# Patient Record
Sex: MALE | Race: WHITE | NOT HISPANIC OR LATINO | Employment: FULL TIME | URBAN - METROPOLITAN AREA
[De-identification: names, ages, dates, MRNs, and addresses within clinical notes are randomized per-mention and may not be internally consistent; named-entity substitution may affect disease eponyms.]

---

## 2018-08-20 NOTE — PROGRESS NOTES
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  He had his wife  Jesika both suspected that he had significant obstructive sleep apnea syndrome.  He finally had a sleep study performed on 1/8/2016 which showed an apnea hypopnea index of 48.7, a REM index is 32.7, a supine index of 68.6, a maria luisa saturation of 75%, and saturations less than 89% for 468 minutes.  He then was provided a positive airway pressure titration but developed treatment emergent central apneas and was placed on ASV.  His final ASV settings were EPAP 6/min pressure support 3/maximum pressure support 15.  His AHI on these final settings was approximate 5.6.      Is very compliant with use. 30 day compliance showed use for 100% of the time for 6:42. Residual ahi is 0.2. Zero leak      There are no active problems to display for this patient.      Past Medical History:   Diagnosis Date   • Sleep apnea    • Snoring     sleep apnea questionairre completed and on chart        Past Surgical History:   Procedure Laterality Date   • INGUINAL HERNIA REPAIR  2/25/2011    Performed by NICKY WANG at SURGERY [facility] ORS   • INGUINAL HERNIA LAPAROSCOPIC  10/10/08    left; Performed by NICKY WANG at SURGERY SAME DAY [facility] ORS   • KNEE ARTHROSCOPY  2007    right       Family History   Problem Relation Age of Onset   • Diabetes Unknown    • Heart Disease Unknown    • Hypertension Unknown    • Cancer Unknown    • Sleep Apnea Brother    • Sleep Apnea Maternal Uncle        Social History     Social History   • Marital status:      Spouse name: N/A   • Number of children: N/A   • Years of education: N/A     Occupational History   • Not on file.     Social History Main Topics   • Smoking status: Never Smoker   • Smokeless tobacco: Never Used   • Alcohol use Yes      Comment: weekends   • Drug use: No   • Sexual activity: Not on file     Other Topics Concern  "  • Not on file     Social History Narrative   • No narrative on file       Current Outpatient Prescriptions   Medication Sig Dispense Refill   • fluticasone (FLONASE) 50 MCG/ACT nasal spray Spray 1 Spray in nose every day.     • aspirin 81 MG tablet Take 81 mg by mouth every day.       No current facility-administered medications for this visit.     \"CURRENT RX\"    ALLERGIES: Sulfa drugs    ROS    Constitutional: Denies fever, chills, sweats,  weight loss, fatigue  Cardiovascular: Denies chest pain, tightness, palpitations, swelling in legs/feet, fainting, difficulty breathing when lying down but gets better when sitting up.   Respiratory: Denies shortness of breath, cough, sputum, wheezing, painful breathing, coughing up blood.   Sleep: per HPI  Gastrointestinal: Denies  difficulty swallowing, nausea, abdominal pain, diarrhea, constipation, heartburn..   Musculoskeletal: Denies painful joints, sore muscles, back pain.        PHYSICAL EXAM      /80   Pulse 77   Resp 16   Ht 1.829 m (6')   Wt 102.5 kg (226 lb)   SpO2 97%   BMI 30.65 kg/m²   Appearance: Well-nourished, well-developed, no acute distress  Eyes:  PERRLA, EOMI  ENMT: without lesions, deformities;hearing grossly intact; tongue normal, posterior pharynx without erythema or exudate; Mallampati classification:   Neck: Supple, trachea midline, no masses  Respiratory effort:  No intercostal retractions or use of accessory muscles  Lung auscultation:  No wheezes rhonchi rubs or rales  Cardiac: No murmurs, rubs, or gallops; regular rhythm, normal rate; no edema  Abdomen:  No tenderness, no organomegaly  Musculoskeletal:  Grossly normal; gait and station normal; digits and nails normal  Skin:  No rashes, petechiae, cyanosis  Neurologic: without focal signs; oriented to person, time, place, and purpose; judgement intact  Psychiatric:  No depression, anxiety, agitation        PROBLEMS:    1. LAVERN on CPAP        PLAN:     Return in about 1 year (around " 8/21/2019).    Has been advised to continue the current ASV  5/3/15 therapy , clean equipment frequently, and get new mask and supplies as allowed by insurance and DME. Advised about potential problems including nasal obstruction/stuffiness, mask leak or discomfort , frequent awakenings, chill or dryness of the upper airway, noise, inconvenience, and lack of benefit. Recommend an earlier appointment, if significant treatment barriers develop.    Was seen by the sleep technician who advised a chinstrap which Dr. Zeng will try first.  If that is unsuccessful then we will try and Dreamwear full facemask large cushions in large frame.

## 2018-08-21 ENCOUNTER — SLEEP CENTER VISIT (OUTPATIENT)
Dept: SLEEP MEDICINE | Facility: MEDICAL CENTER | Age: 53
End: 2018-08-21
Payer: COMMERCIAL

## 2018-08-21 VITALS
SYSTOLIC BLOOD PRESSURE: 128 MMHG | DIASTOLIC BLOOD PRESSURE: 80 MMHG | HEIGHT: 72 IN | HEART RATE: 77 BPM | OXYGEN SATURATION: 97 % | RESPIRATION RATE: 16 BRPM | BODY MASS INDEX: 30.61 KG/M2 | WEIGHT: 226 LBS

## 2018-08-21 DIAGNOSIS — G47.33 OSA ON CPAP: ICD-10-CM

## 2018-08-21 DIAGNOSIS — Z78.9 NON-SMOKER: ICD-10-CM

## 2018-08-21 PROCEDURE — 99214 OFFICE O/P EST MOD 30 MIN: CPT | Performed by: INTERNAL MEDICINE

## 2018-08-21 RX ORDER — FLUTICASONE PROPIONATE 50 MCG
1 SPRAY, SUSPENSION (ML) NASAL DAILY
COMMUNITY

## 2019-02-06 ENCOUNTER — HOSPITAL ENCOUNTER (OUTPATIENT)
Dept: LAB | Facility: MEDICAL CENTER | Age: 54
End: 2019-02-06
Attending: FAMILY MEDICINE
Payer: COMMERCIAL

## 2019-02-06 LAB
ALBUMIN SERPL BCP-MCNC: 4.4 G/DL (ref 3.2–4.9)
ALBUMIN/GLOB SERPL: 1.9 G/DL
ALP SERPL-CCNC: 35 U/L (ref 30–99)
ALT SERPL-CCNC: 23 U/L (ref 2–50)
ANION GAP SERPL CALC-SCNC: 6 MMOL/L (ref 0–11.9)
AST SERPL-CCNC: 21 U/L (ref 12–45)
BASOPHILS # BLD AUTO: 1.3 % (ref 0–1.8)
BASOPHILS # BLD: 0.07 K/UL (ref 0–0.12)
BILIRUB SERPL-MCNC: 1 MG/DL (ref 0.1–1.5)
BUN SERPL-MCNC: 21 MG/DL (ref 8–22)
CALCIUM SERPL-MCNC: 9.3 MG/DL (ref 8.5–10.5)
CHLORIDE SERPL-SCNC: 104 MMOL/L (ref 96–112)
CHOLEST SERPL-MCNC: 147 MG/DL (ref 100–199)
CO2 SERPL-SCNC: 25 MMOL/L (ref 20–33)
CREAT SERPL-MCNC: 0.98 MG/DL (ref 0.5–1.4)
EOSINOPHIL # BLD AUTO: 0.23 K/UL (ref 0–0.51)
EOSINOPHIL NFR BLD: 4.1 % (ref 0–6.9)
ERYTHROCYTE [DISTWIDTH] IN BLOOD BY AUTOMATED COUNT: 42.5 FL (ref 35.9–50)
EST. AVERAGE GLUCOSE BLD GHB EST-MCNC: 114 MG/DL
FASTING STATUS PATIENT QL REPORTED: NORMAL
GLOBULIN SER CALC-MCNC: 2.3 G/DL (ref 1.9–3.5)
GLUCOSE SERPL-MCNC: 112 MG/DL (ref 65–99)
HBA1C MFR BLD: 5.6 % (ref 0–5.6)
HCT VFR BLD AUTO: 46 % (ref 42–52)
HDLC SERPL-MCNC: 45 MG/DL
HGB BLD-MCNC: 15.5 G/DL (ref 14–18)
IMM GRANULOCYTES # BLD AUTO: 0.01 K/UL (ref 0–0.11)
IMM GRANULOCYTES NFR BLD AUTO: 0.2 % (ref 0–0.9)
LDLC SERPL CALC-MCNC: 84 MG/DL
LYMPHOCYTES # BLD AUTO: 1.72 K/UL (ref 1–4.8)
LYMPHOCYTES NFR BLD: 30.9 % (ref 22–41)
MCH RBC QN AUTO: 30 PG (ref 27–33)
MCHC RBC AUTO-ENTMCNC: 33.7 G/DL (ref 33.7–35.3)
MCV RBC AUTO: 89.1 FL (ref 81.4–97.8)
MONOCYTES # BLD AUTO: 0.36 K/UL (ref 0–0.85)
MONOCYTES NFR BLD AUTO: 6.5 % (ref 0–13.4)
NEUTROPHILS # BLD AUTO: 3.17 K/UL (ref 1.82–7.42)
NEUTROPHILS NFR BLD: 57 % (ref 44–72)
NRBC # BLD AUTO: 0 K/UL
NRBC BLD-RTO: 0 /100 WBC
PLATELET # BLD AUTO: 210 K/UL (ref 164–446)
PMV BLD AUTO: 10.9 FL (ref 9–12.9)
POTASSIUM SERPL-SCNC: 3.9 MMOL/L (ref 3.6–5.5)
PROT SERPL-MCNC: 6.7 G/DL (ref 6–8.2)
PSA SERPL-MCNC: 0.94 NG/ML (ref 0–4)
RBC # BLD AUTO: 5.16 M/UL (ref 4.7–6.1)
SODIUM SERPL-SCNC: 135 MMOL/L (ref 135–145)
TRIGL SERPL-MCNC: 88 MG/DL (ref 0–149)
TSH SERPL DL<=0.005 MIU/L-ACNC: 1.91 UIU/ML (ref 0.38–5.33)
WBC # BLD AUTO: 5.6 K/UL (ref 4.8–10.8)

## 2019-02-06 PROCEDURE — 83036 HEMOGLOBIN GLYCOSYLATED A1C: CPT

## 2019-02-06 PROCEDURE — 84153 ASSAY OF PSA TOTAL: CPT

## 2019-02-06 PROCEDURE — 84443 ASSAY THYROID STIM HORMONE: CPT

## 2019-02-06 PROCEDURE — 80053 COMPREHEN METABOLIC PANEL: CPT

## 2019-02-06 PROCEDURE — 80061 LIPID PANEL: CPT

## 2019-02-06 PROCEDURE — 36415 COLL VENOUS BLD VENIPUNCTURE: CPT

## 2019-02-06 PROCEDURE — 85025 COMPLETE CBC W/AUTO DIFF WBC: CPT

## 2019-08-21 ENCOUNTER — APPOINTMENT (OUTPATIENT)
Dept: SLEEP MEDICINE | Facility: MEDICAL CENTER | Age: 54
End: 2019-08-21
Payer: COMMERCIAL

## 2020-09-09 ENCOUNTER — RX ONLY (OUTPATIENT)
Age: 55
Setting detail: RX ONLY
End: 2020-09-09

## 2020-09-09 RX ORDER — CLINDAMYCIN PHOSPHATE 10 MG/G
GEL TOPICAL
Qty: 3 | Refills: 5 | Status: ERX | COMMUNITY
Start: 2020-09-09

## 2020-09-29 ENCOUNTER — TELEPHONE (OUTPATIENT)
Dept: PULMONOLOGY | Facility: HOSPICE | Age: 55
End: 2020-09-29

## 2020-09-29 NOTE — TELEPHONE ENCOUNTER
Pt called states he has not been seen in a while and is currently having issues with what he thinks could be his mask fit or his PAP . Pt states he has been on Therapy for 5+ years.     Informed pt he would need a follow up to review as we have not seen him since 08/21/18 Dr. Monk      Pt states he can only be seen on Fridays and would prefer to follow up with MD only.     Informed patient we do not currently have any follow ups available as we are completely booked for the year , Pt states he knows Dr. Monk and would like to know if he could maybe be added on to the end of the day , if possible .     Sleep Compliance Uploaded from 06/14/2020 - 07/13/2020      Best CB Number for patient is [phone] ( Mobile)     Please Advise

## 2020-10-01 NOTE — TELEPHONE ENCOUNTER
Sanju Monk M.D.  You 1 hour ago (10:02 AM)     Please add him at the end of the day on 10/23    Message text       Sanju Monk M.D.  You 1 hour ago (10:02 AM)     Please add him on 10/23 or 12/4.          [facility] offering appointments as well as sent [employer] Message

## 2020-10-01 NOTE — TELEPHONE ENCOUNTER
Patient has been added on to 10/23/20 at 3pm    Informed we may be at new office but we will call a week before to notify him

## 2020-10-22 PROBLEM — Z23 NEED FOR INFLUENZA VACCINATION: Status: ACTIVE | Noted: 2020-10-22

## 2020-10-22 PROBLEM — Z78.9 NON-SMOKER: Status: ACTIVE | Noted: 2020-10-22

## 2020-10-22 PROBLEM — G47.33 OSA (OBSTRUCTIVE SLEEP APNEA): Status: ACTIVE | Noted: 2020-10-22

## 2020-10-22 NOTE — PROGRESS NOTES
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 He and his wife  Jesika both suspected that he had significant obstructive sleep apnea syndrome. He finally had a sleep study performed on 1/8/2016 which showed an apnea hypopnea index of 48.7, a REM index is 32.7, a supine index of 68.6, a maria luisa saturation of 75%, and saturations less than 89% for 468 minutes. He then was provided a positive airway pressure titration but developed treatment emergent central apneas and was placed on ASV. His final ASV settings were 6/3/15 centimeters water.  His AHI on these final settings was 5.6 with a maria luisa saturation of 88% and a mean saturation of 92.1%.  When last seen 8/21/2018 his compliance was 100% and his residual estimated apnea hypopnea index was 0.2 without any significant leak.    His 30-day compliance is 100% for 6 hours and 56 minutes.  On ASV 5/3/2015 centimeters water his AHI is 0.1 without any significant leak.  His wife says that he is snoring and when the machine ramps up with sinus pressure it interferes with his sleep.    Significant comorbidities and modifying factors include non-smoker and need for influenza vaccination.      Patient Active Problem List    Diagnosis Date Noted   • LAVERN (obstructive sleep apnea) 10/22/2020   • Non-smoker 10/22/2020   • Need for influenza vaccination 10/22/2020       Past Medical History:   Diagnosis Date   • Sleep apnea    • Snoring     sleep apnea questionairre completed and on chart        Past Surgical History:   Procedure Laterality Date   • INGUINAL HERNIA REPAIR  2/25/2011    Performed by NICKY WANG at SURGERY [facility] ORS   • INGUINAL HERNIA LAPAROSCOPIC  10/10/08    left; Performed by NICKY WANG at SURGERY SAME DAY [facility] ORS   • KNEE ARTHROSCOPY  2007    right       Family History   Problem Relation Age of Onset   • Diabetes Other    • Heart Disease Other    •  "Hypertension Other    • Cancer Other    • Sleep Apnea Brother    • Sleep Apnea Maternal Uncle        Social History     Socioeconomic History   • Marital status:      Spouse name: Not on file   • Number of children: Not on file   • Years of education: Not on file   • Highest education level: Not on file   Occupational History   • Not on file   Social Needs   • Financial resource strain: Not on file   • Food insecurity     Worry: Not on file     Inability: Not on file   • Transportation needs     Medical: Not on file     Non-medical: Not on file   Tobacco Use   • Smoking status: Never Smoker   • Smokeless tobacco: Never Used   Substance and Sexual Activity   • Alcohol use: Yes     Comment: weekends   • Drug use: No   • Sexual activity: Not on file   Lifestyle   • Physical activity     Days per week: Not on file     Minutes per session: Not on file   • Stress: Not on file   Relationships   • Social connections     Talks on phone: Not on file     Gets together: Not on file     Attends Sikhism service: Not on file     Active member of club or organization: Not on file     Attends meetings of clubs or organizations: Not on file     Relationship status: Not on file   • Intimate partner violence     Fear of current or ex partner: Not on file     Emotionally abused: Not on file     Physically abused: Not on file     Forced sexual activity: Not on file   Other Topics Concern   • Not on file   Social History Narrative   • Not on file       Current Outpatient Medications   Medication Sig Dispense Refill   • fluticasone (FLONASE) 50 MCG/ACT nasal spray Spray 1 Spray in nose every day.     • aspirin 81 MG tablet Take 81 mg by mouth every day.       No current facility-administered medications for this visit.     \"CURRENT RX\"    ALLERGIES: Sulfa drugs    ROS    Constitutional: Denies fever, chills, sweats,  weight loss, fatigue  Cardiovascular: Denies chest pain, tightness, palpitations, swelling in legs/feet, fainting, " difficulty breathing when lying down but gets better when sitting up.   Respiratory: Denies shortness of breath, cough, sputum, wheezing, painful breathing, coughing up blood.   Sleep: per HPI  Gastrointestinal: Denies  difficulty swallowing, nausea, abdominal pain, diarrhea, constipation, heartburn.  Musculoskeletal: Denies painful joints, sore muscles, back pain.        PHYSICAL EXAM  Increased BMI noted    /82 (BP Location: Right arm, Patient Position: Sitting, BP Cuff Size: Adult)   Pulse 78   Ht 1.829 m (6')   Wt 104.3 kg (230 lb)   SpO2 94%   BMI 31.19 kg/m²   Appearance: Well-nourished, well-developed, no acute distress  Eyes:  PERRLA, EOMI  ENMT: Mask on  Neck: Supple, trachea midline, no masses  Respiratory effort:  No intercostal retractions or use of accessory muscles  Lung auscultation:  No wheezes rhonchi rubs or rales  Cardiac: No murmurs, rubs, or gallops; regular rhythm, normal rate; no edema  Abdomen:  No tenderness, no organomegaly.  Musculoskeletal:  Grossly normal; gait and station normal; digits and nails normal  Skin:  No rashes, petechiae, cyanosis  Neurologic: without focal signs; oriented to person, time, place, and purpose; judgement intact  Psychiatric:  No depression, anxiety, agitation        PROBLEMS:      1. LAVERN (obstructive sleep apnea)  - DME ASV    2. Non-smoker    3. Need for influenza vaccination    4. Increased BMI      PLAN:   His 30-day compliance is 100% for 6 hours and 56 minutes.  On ASV 5/3/2015 centimeters water his AHI is 0.1 without any significant leak.  His wife says that he is snoring and when the machine ramps up with sinus pressure it interferes with his sleep.    His machine is almost 5 years old. Will order a new ASV machine.    Has been advised to continue toclean equipment frequently, and get new mask and supplies as allowed by insurance and DME. Advised about potential problems including nasal obstruction/stuffiness, mask leak or discomfort ,  frequent awakenings, chill or dryness of the upper airway, noise, inconvenience, and lack of benefit. Recommend an earlier appointment, if significant treatment barriers develop.    Have advised the patient to follow up with the appropriate healthcare practitioners for all other medical problems and issues.    Return in about 10 weeks (around 1/1/2021).

## 2020-10-23 ENCOUNTER — OFFICE VISIT (OUTPATIENT)
Dept: SLEEP MEDICINE | Facility: MEDICAL CENTER | Age: 55
End: 2020-10-23
Payer: COMMERCIAL

## 2020-10-23 VITALS
SYSTOLIC BLOOD PRESSURE: 128 MMHG | WEIGHT: 230 LBS | HEART RATE: 78 BPM | DIASTOLIC BLOOD PRESSURE: 82 MMHG | BODY MASS INDEX: 31.15 KG/M2 | OXYGEN SATURATION: 94 % | HEIGHT: 72 IN

## 2020-10-23 DIAGNOSIS — Z78.9 NON-SMOKER: ICD-10-CM

## 2020-10-23 DIAGNOSIS — R63.8 INCREASED BMI: ICD-10-CM

## 2020-10-23 DIAGNOSIS — G47.33 OSA (OBSTRUCTIVE SLEEP APNEA): ICD-10-CM

## 2020-10-23 DIAGNOSIS — Z23 NEED FOR INFLUENZA VACCINATION: ICD-10-CM

## 2020-10-23 PROCEDURE — 99214 OFFICE O/P EST MOD 30 MIN: CPT | Performed by: INTERNAL MEDICINE

## 2020-10-23 ASSESSMENT — FIBROSIS 4 INDEX: FIB4 SCORE: 1.125977835908203719

## 2020-11-12 ENCOUNTER — HOSPITAL ENCOUNTER (OUTPATIENT)
Dept: LAB | Facility: MEDICAL CENTER | Age: 55
End: 2020-11-12
Attending: FAMILY MEDICINE
Payer: COMMERCIAL

## 2020-11-12 LAB — COVID ORDER STATUS COVID19: NORMAL

## 2020-11-12 PROCEDURE — C9803 HOPD COVID-19 SPEC COLLECT: HCPCS

## 2020-11-12 PROCEDURE — U0003 INFECTIOUS AGENT DETECTION BY NUCLEIC ACID (DNA OR RNA); SEVERE ACUTE RESPIRATORY SYNDROME CORONAVIRUS 2 (SARS-COV-2) (CORONAVIRUS DISEASE [COVID-19]), AMPLIFIED PROBE TECHNIQUE, MAKING USE OF HIGH THROUGHPUT TECHNOLOGIES AS DESCRIBED BY CMS-2020-01-R: HCPCS

## 2020-11-13 LAB
SARS-COV-2 RNA RESP QL NAA+PROBE: NOTDETECTED
SPECIMEN SOURCE: NORMAL

## 2021-03-15 DIAGNOSIS — Z23 NEED FOR VACCINATION: ICD-10-CM

## 2021-05-06 ENCOUNTER — TELEPHONE (OUTPATIENT)
Dept: SLEEP MEDICINE | Facility: MEDICAL CENTER | Age: 56
End: 2021-05-06

## 2021-05-06 NOTE — TELEPHONE ENCOUNTER
"Patient left vm, per message last seen October/2020 with Dr. Monk and was to follow up in January. Has not follow up, \"insurance denied clinicals\" needs a reevaluation for compliance. Patient is off Fridays.     Pt scheduled 5/7/21 jonah Villeda     Compliance for 30 and 60 days scanned in media.   "

## 2021-05-07 ENCOUNTER — OFFICE VISIT (OUTPATIENT)
Dept: SLEEP MEDICINE | Facility: MEDICAL CENTER | Age: 56
End: 2021-05-07
Payer: COMMERCIAL

## 2021-05-07 VITALS
WEIGHT: 225 LBS | RESPIRATION RATE: 16 BRPM | SYSTOLIC BLOOD PRESSURE: 110 MMHG | OXYGEN SATURATION: 96 % | DIASTOLIC BLOOD PRESSURE: 80 MMHG | HEIGHT: 72 IN | HEART RATE: 92 BPM | BODY MASS INDEX: 30.48 KG/M2

## 2021-05-07 DIAGNOSIS — G47.33 OSA (OBSTRUCTIVE SLEEP APNEA): ICD-10-CM

## 2021-05-07 PROCEDURE — 99213 OFFICE O/P EST LOW 20 MIN: CPT | Performed by: NURSE PRACTITIONER

## 2021-05-07 NOTE — PATIENT INSTRUCTIONS
1. Continue using ASV@ 6/3/15. Ordered new mask and supplies. Try using full face mask and if symptoms aren't improving, I will schedule a titration study.   2. Reach out via [employer] w any questions or concerns.

## 2021-05-07 NOTE — PROGRESS NOTES
Chief Complaint   Patient presents with   • Apnea     Last Seen 10/23/20       HPI:  Mohinder Zeng is a 55 y.o. year old male here today for follow-up on Central sleep apnea.     Since last seen October 23, 2020 by Dr. Monk.  He had a sleep study done January 8, 2016 which showed AHI of 48.7 REM index is 32.7 in supine index of 60.6, a maria luisa saturation of 75 percent.  He was placed on ASV with settings 6/3/15 centimeters/H2O.    He states he is using a ResMed machine with nasal pillows.  He states that he is experiencing snoring despite using chin straps.  Snoring is bothersome.  He is now sleeping in a separate from to avoid disturbing his wife.  He has been in contact with the respiratory therapist at [facility] who is suggesting he use a fullface mask.  It should arrive soon.    30-day compliance shows 97% usage (29/30 days).  Average usage time is 6 hours and 47 minutes.  Mode is ASV at 6/3/15 centimeters/H2O with a resultant AHI of 0.3 no leakage is noted.     He denies excessive daytime somnolence, headache, palpitations, concentration or memory problems.    ROS: As per HPI and otherwise negative if not stated.    Past Medical History:   Diagnosis Date   • Sleep apnea    • Snoring     sleep apnea questionairre completed and on chart       Past Surgical History:   Procedure Laterality Date   • INGUINAL HERNIA REPAIR  2/25/2011    Performed by NICKY WANG at SURGERY [facility] ORS   • INGUINAL HERNIA LAPAROSCOPIC  10/10/08    left; Performed by NICKY WANG at SURGERY SAME DAY [facility] ORS   • KNEE ARTHROSCOPY  2007    right       Family History   Problem Relation Age of Onset   • Diabetes Other    • Heart Disease Other    • Hypertension Other    • Cancer Other    • Sleep Apnea Brother    • Sleep Apnea Maternal Uncle        Allergies as of 05/07/2021 - Reviewed 05/07/2021   Allergen Reaction Noted   • Sulfa drugs Hives 02/15/2011        Vitals:  /80 (BP Location: Left arm, Patient Position:  Sitting, BP Cuff Size: Adult)   Pulse 92   Resp 16   Ht 1.829 m (6')   Wt 102 kg (225 lb)   SpO2 96%     Current medications as of today   Current Outpatient Medications   Medication Sig Dispense Refill   • fluticasone (FLONASE) 50 MCG/ACT nasal spray Spray 1 Spray in nose every day.     • aspirin 81 MG tablet Take 81 mg by mouth every day.       No current facility-administered medications for this visit.         Physical Exam:   Gen:           Alert and oriented, No apparent distress. Mood and affect appropriate, normal interaction with examiner.  Eyes:          PERRL, EOM intact, sclere white, conjunctive moist.  Ears:          Not examined.   Hearing:     Grossly intact.  Nose:          Normal, no lesions or deformities.  Dentition:    Good dentition.  Oropharynx:   Tongue normal, posterior pharynx without erythema or exudate.  Neck:        Supple, trachea midline, no masses.  Respiratory Effort: No intercostal retractions or use of accessory muscles.   Lung Auscultation:      Clear to auscultation bilaterally; no rales, rhonchi or wheezing.  CV:            Regular rate and rhythm. No murmurs, rubs or gallops.  Abd:           Not examined.   Lymphadenopathy: Not examined.  Gait and Station: Normal.  Digits and Nails: No clubbing, cyanosis, petechiae, or nodes.   Cranial Nerves: II-XII grossly intact.  Skin:        No rashes, lesions or ulcers noted.               Ext:           No cyanosis or edema.      Assessment:  1. LAVERN (obstructive sleep apnea)  DME Mask and Supplies         Plan:  1.  Continue using ASV at prescribed settings.  Ordered new mask and supplies.  Suggested patient attempt to use a fullface mask as soon as it arrives.  If he feels like the problems are still persisting, I will order a sleep titration study.  Patient was advised to reach out via [employer] after a week or 2 when he starts using his full facemask.  He was also advised to clean mask and supplies at least weekly if not  sooner.  2.  He requested that we follow-up in 6 months as opposed to a year.  I advised him that if he needs an appointment sooner or any assistance he can reach out by phone or MyChart.    Please note that this dictation was created using voice recognition software. I have made every reasonable attempt to correct obvious errors, but it is possible there are errors of grammar and possibly content that I did not discover before finalizing the note.

## 2022-01-04 NOTE — PROGRESS NOTES
CC: Follow-up for treatment emergent central apnea        HPI:  Mohinder Zeng is a 56 y.o.male  kindly referred by Eliud AQUINO M.D. and last seen 5/7/2021.  His sleep study was performed on 1/8/2016 and he was found to have an AHI of 48.7, a REM index of 32.7 and a supine index of 68.6.  His maria luisa saturation was 75% and saturations were less than 89% for 468 minutes.  A PAP titration revealed treatment emergent central apnea for which he was placed on ASV.    When last seen on 5/7/2021 he was on ASV 6/3/2015 centimeters water with excellent compliance and a normalized AHI of 0.3 with no leak.    Nevertheless he had continued to have bothersome snoring such that he and his wife are sleeping separately.     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  The snoring greatly bothers his wife  Sharda Canchola.    Significant comorbidities modifying factors include need for SARS-CoV-2 booster, need for 2021 influenza vaccination, never smoker, and obesity.  Patient Active Problem List    Diagnosis Date Noted   • LAVERN (obstructive sleep apnea) 10/22/2020   • Non-smoker 10/22/2020   • Need for influenza vaccination 10/22/2020       Past Medical History:   Diagnosis Date   • Sleep apnea    • Snoring     sleep apnea questionairre completed and on chart        Past Surgical History:   Procedure Laterality Date   • INGUINAL HERNIA REPAIR  2/25/2011    Performed by NICKY WANG at SURGERY [facility] ORS   • INGUINAL HERNIA LAPAROSCOPIC  10/10/08    left; Performed by NICKY WANG at SURGERY SAME DAY [facility] ORS   • KNEE ARTHROSCOPY  2007    right       Family History   Problem Relation Age of Onset   • Diabetes Other    • Heart Disease Other    • Hypertension Other    • Cancer Other    • Sleep Apnea Brother    • Sleep Apnea Maternal Uncle        Social History     Socioeconomic History   • Marital status:      Spouse name: Not on file   • Number of children: Not on file    • Years of education: Not on file   • Highest education level: Not on file   Occupational History   • Not on file   Tobacco Use   • Smoking status: Never Smoker   • Smokeless tobacco: Never Used   Vaping Use   • Vaping Use: Never used   Substance and Sexual Activity   • Alcohol use: Yes     Comment: weekends   • Drug use: No   • Sexual activity: Not on file   Other Topics Concern   • Not on file   Social History Narrative   • Not on file     Social Determinants of Health     Financial Resource Strain:    • Difficulty of Paying Living Expenses: Not on file   Food Insecurity:    • Worried About Running Out of Food in the Last Year: Not on file   • Ran Out of Food in the Last Year: Not on file   Transportation Needs:    • Lack of Transportation (Medical): Not on file   • Lack of Transportation (Non-Medical): Not on file   Physical Activity:    • Days of Exercise per Week: Not on file   • Minutes of Exercise per Session: Not on file   Stress:    • Feeling of Stress : Not on file   Social Connections:    • Frequency of Communication with Friends and Family: Not on file   • Frequency of Social Gatherings with Friends and Family: Not on file   • Attends Buddhism Services: Not on file   • Active Member of Clubs or Organizations: Not on file   • Attends Club or Organization Meetings: Not on file   • Marital Status: Not on file   Intimate Partner Violence:    • Fear of Current or Ex-Partner: Not on file   • Emotionally Abused: Not on file   • Physically Abused: Not on file   • Sexually Abused: Not on file   Housing Stability:    • Unable to Pay for Housing in the Last Year: Not on file   • Number of Places Lived in the Last Year: Not on file   • Unstable Housing in the Last Year: Not on file       Current Outpatient Medications   Medication Sig Dispense Refill   • fluticasone (FLONASE) 50 MCG/ACT nasal spray Spray 1 Spray in nose every day.     • aspirin 81 MG tablet Take 81 mg by mouth every day.       No current  "facility-administered medications for this visit.    \"CURRENT RX\"    ALLERGIES: Sulfa drugs    ROS    Constitutional: Denies fever, chills, sweats,  weight loss, fatigue  Cardiovascular: Denies chest pain, tightness, palpitations, swelling in legs/feet, fainting, difficulty breathing when lying down but gets better when sitting up.   Respiratory: Denies shortness of breath, cough, sputum, wheezing, painful breathing, coughing up blood.   Sleep: per HPI  Gastrointestinal: Denies  difficulty swallowing, nausea, abdominal pain, diarrhea, constipation, heartburn.  Musculoskeletal: Denies painful joints, sore muscles, back pain.        PHYSICAL EXAM      There were no vitals taken for this visit.  Appearance: Well-nourished, well-developed, no acute distress  Eyes:  PERRLA, EOMI  ENMT: masked  Neck: Supple, trachea midline, no masses  Respiratory effort:  No intercostal retractions or use of accessory muscles  Lung auscultation:  No wheezes rhonchi rubs or rales  Cardiac: No murmurs, rubs, or gallops; regular rhythm, normal rate; no edema  Abdomen:  No tenderness, no organomegaly.  Musculoskeletal:  Grossly normal; gait and station normal; digits and nails normal  Skin:  No rashes, petechiae, cyanosis  Neurologic: without focal signs; oriented to person, time, place, and purpose; judgement intact  Psychiatric:  No depression, anxiety, agitation      Medical Decision Making       The medical record was reviewed in its entirety including the referral notes, records from primary care, consultants notes, hospital records, lab, imaging, microbiology, immunology, and immunizations.  Care gaps identified and reviewed with the patient.    Diagnostic and titration nocturnal polysomnogram's, home sleep apnea tests, continuous nocturnal oximetry results, multiple sleep latency tests, and compliance reports reviewed.  There are no diagnoses linked to this encounter.    PLAN:   Has been advised to continue the current ***, clean " equipment frequently, and get new mask and supplies as allowed by insurance and DME. Advised about potential problems including nasal obstruction/stuffiness, mask leak or discomfort , frequent awakenings, chill or dryness of the upper airway, noise, inconvenience, and lack of benefit. Recommend an earlier appointment, if significant treatment barriers develop.    The risks of untreated sleep apnea were discussed with the patient at length. Patients with LAVERN are at increased risk of cardiovascular disease including coronary artery disease, systemic arterial hypertension, pulmonary arterial hypertension, cardiac arrythmias, and stroke. LAVERN patients have an increased risk of motor vehicle accidents, type 2 diabetes, chronic kidney disease, and non-alcoholic liver disease. The patient was advised to avoid driving a motor vehicle when drowsy.    Positive airway pressure will favorably impact many of the adverse conditions and effects provoked by LAVERN.    Have advised the patient to follow up with the appropriate healthcare practitioners for all other medical problems and issues.    Mask wearing, handwashing, and social distancing protocols reviewed and encouraged.    No follow-ups on file.      Total time 30 minutes

## 2022-01-06 ENCOUNTER — OFFICE VISIT (OUTPATIENT)
Dept: SLEEP MEDICINE | Facility: MEDICAL CENTER | Age: 57
End: 2022-01-06
Payer: COMMERCIAL

## 2022-01-14 ENCOUNTER — TELEPHONE (OUTPATIENT)
Dept: SLEEP MEDICINE | Facility: MEDICAL CENTER | Age: 57
End: 2022-01-14

## 2022-01-14 NOTE — TELEPHONE ENCOUNTER
Patient called requesting to schedule follow up with Dr. Monk, stated he can only due fridays Very Early in the Morning or Late in the Afternoon.     Called pt back and left voicemail informing them that Dr. Monk in only seeing pt's from Tuesday - Thursday and latest appt would be 1:40pm .    Informed to CB    When would be best to schedule patient for you if late add on is okay ?

## 2022-01-18 NOTE — PROGRESS NOTES
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 He and his wife Dr. Canchola both suspected that he had significant obstructive sleep apnea syndrome. He finally had a sleep study performed on 1/8/2016 which showed an apnea hypopnea index of 48.7, a REM index is 32.7, a supine index of 68.6, a maria luisa saturation of 75%, and saturations less than 89% for 468 minutes. He then was provided a positive airway pressure titration but developed treatment emergent central apneas and was placed on ASV. His final ASV settings were 6/3/15 centimeters water. His AHI on these final settings was 5.6 with a maria luisa saturation of 88% and a mean saturation of 92.1%.    Today his 30-day compliance is 77% for 6 hours and 36 minutes.  He has an average residual apnea-hypopnea index of 0.6 on ASV 6/3/2015 centimeters water we also have a travel unit that he used for the remainder of the days for 8 hours and 5 minutes although the setting is 5/3/2015 centimeters water slightly different than his other unit.  His AHI is even better at 0.3 without any significant leak.      When last seen on 5/7/21 his 30-day compliance with 97% for 6 hours and 47 minutes.  He had a normalized AHI of 0.3 on ASV at 6/3/2015 centimeters water.    He continues to experience snoring and is sleeping separately from his wife.  His snoring seems to be worse if he consumes any adult beverages.    He wonders if he needs a new sleep study.  Significant comorbidities modifying factors include up-to-date with SARS-CoV-2 vaccine, up-to-date with influenza vaccination, possible prediabetes, tachycardia and obesity.    Patient Active Problem List    Diagnosis Date Noted   • LAVERN (obstructive sleep apnea) 10/22/2020   • Non-smoker 10/22/2020   • Need for influenza vaccination 10/22/2020       Past Medical History:   Diagnosis Date   • Sleep apnea    • Snoring      sleep apnea questionairre completed and on chart        Past Surgical History:   Procedure Laterality Date   • INGUINAL HERNIA REPAIR  2/25/2011    Performed by NICKY WANG at SURGERY [facility] ORS   • INGUINAL HERNIA LAPAROSCOPIC  10/10/08    left; Performed by NICKY WANG at SURGERY SAME DAY [facility] ORS   • KNEE ARTHROSCOPY  2007    right       Family History   Problem Relation Age of Onset   • Diabetes Other    • Heart Disease Other    • Hypertension Other    • Cancer Other    • Sleep Apnea Brother    • Sleep Apnea Maternal Uncle        Social History     Socioeconomic History   • Marital status:      Spouse name: Not on file   • Number of children: Not on file   • Years of education: Not on file   • Highest education level: Not on file   Occupational History   • Not on file   Tobacco Use   • Smoking status: Never Smoker   • Smokeless tobacco: Never Used   Vaping Use   • Vaping Use: Never used   Substance and Sexual Activity   • Alcohol use: Yes     Comment: weekends   • Drug use: No   • Sexual activity: Not on file   Other Topics Concern   • Not on file   Social History Narrative   • Not on file     Social Determinants of Health     Financial Resource Strain:    • Difficulty of Paying Living Expenses: Not on file   Food Insecurity:    • Worried About Running Out of Food in the Last Year: Not on file   • Ran Out of Food in the Last Year: Not on file   Transportation Needs:    • Lack of Transportation (Medical): Not on file   • Lack of Transportation (Non-Medical): Not on file   Physical Activity:    • Days of Exercise per Week: Not on file   • Minutes of Exercise per Session: Not on file   Stress:    • Feeling of Stress : Not on file   Social Connections:    • Frequency of Communication with Friends and Family: Not on file   • Frequency of Social Gatherings with Friends and Family: Not on file   • Attends Yarsanism Services: Not on file   • Active Member of Clubs or Organizations: Not on file  "  • Attends Club or Organization Meetings: Not on file   • Marital Status: Not on file   Intimate Partner Violence:    • Fear of Current or Ex-Partner: Not on file   • Emotionally Abused: Not on file   • Physically Abused: Not on file   • Sexually Abused: Not on file   Housing Stability:    • Unable to Pay for Housing in the Last Year: Not on file   • Number of Places Lived in the Last Year: Not on file   • Unstable Housing in the Last Year: Not on file       Current Outpatient Medications   Medication Sig Dispense Refill   • fluticasone (FLONASE) 50 MCG/ACT nasal spray Spray 1 Spray in nose every day.     • aspirin 81 MG tablet Take 81 mg by mouth every day.       No current facility-administered medications for this visit.    \"CURRENT RX\"    ALLERGIES: Sulfa drugs    ROS    Constitutional: Denies fever, chills, sweats,  weight loss, fatigue  Cardiovascular: Denies chest pain, tightness, palpitations, swelling in legs/feet, fainting, difficulty breathing when lying down but gets better when sitting up.   Respiratory: Denies shortness of breath, cough, sputum, wheezing, painful breathing, coughing up blood.   Sleep: per HPI  Gastrointestinal: Denies  difficulty swallowing, nausea, abdominal pain, diarrhea, constipation, heartburn.  Musculoskeletal: Denies painful joints, sore muscles, back pain.        PHYSICAL EXAM      /88 (BP Location: Left arm, Patient Position: Sitting, BP Cuff Size: Adult)   Pulse 86   Resp 14   Ht 1.829 m (6')   Wt 103 kg (227 lb)   SpO2 96%   BMI 30.79 kg/m²   Appearance: Well-nourished, well-developed, no acute distress  Eyes:  PERRLA, EOMI  ENMT: masked  Neck: Supple, trachea midline, no masses  Respiratory effort:  No intercostal retractions or use of accessory muscles  Lung auscultation:  No wheezes rhonchi rubs or rales  Cardiac: No murmurs, rubs, or gallops; regular rhythm, normal rate; no edema  Abdomen:  No tenderness, no organomegaly.  Musculoskeletal:  Grossly normal; " gait and station normal; digits and nails normal  Skin:  No rashes, petechiae, cyanosis  Neurologic: without focal signs; oriented to person, time, place, and purpose; judgement intact  Psychiatric:  No depression, anxiety, agitation      Medical Decision Making       The medical record was reviewed in its entirety including the referral notes, records from primary care, consultants notes, hospital records, lab, imaging, microbiology, immunology, and immunizations.  Care gaps identified and reviewed with the patient.    Diagnostic and titration nocturnal polysomnogram's, home sleep apnea tests, continuous nocturnal oximetry results, multiple sleep latency tests, and compliance reports reviewed.  1. LAVERN (obstructive sleep apnea)  - DME Mask and Supplies      PLAN:   Has been advised to continue the current  clean equipment frequently, and get new mask and supplies as allowed by insurance and DME. Advised about potential problems including nasal obstruction/stuffiness, mask leak or discomfort , frequent awakenings, chill or dryness of the upper airway, noise, inconvenience, and lack of benefit. Recommend an earlier appointment, if significant treatment barriers develop.    The risks of untreated sleep apnea were discussed with the patient at length. Patients with LAVERN are at increased risk of cardiovascular disease including coronary artery disease, systemic arterial hypertension, pulmonary arterial hypertension, cardiac arrythmias, and stroke. LAVERN patients have an increased risk of motor vehicle accidents, type 2 diabetes, chronic kidney disease, and non-alcoholic liver disease. The patient was advised to avoid driving a motor vehicle when drowsy.    Positive airway pressure will favorably impact many of the adverse conditions and effects provoked by LAVERN.    Have advised the patient to follow up with the appropriate healthcare practitioners for all other medical problems and issues.    N95 mask wearing, handwashing,  and social distancing protocols reviewed and encouraged.    Return in about 1 year (around 1/19/2023).      Total time 30 minutes    Borderline elevated blood pressure noted.  He will discuss with Dr. Mcnally his PCP.

## 2022-01-19 ENCOUNTER — OFFICE VISIT (OUTPATIENT)
Dept: SLEEP MEDICINE | Facility: MEDICAL CENTER | Age: 57
End: 2022-01-19
Payer: COMMERCIAL

## 2022-01-19 VITALS
HEIGHT: 72 IN | OXYGEN SATURATION: 96 % | RESPIRATION RATE: 14 BRPM | HEART RATE: 86 BPM | DIASTOLIC BLOOD PRESSURE: 88 MMHG | SYSTOLIC BLOOD PRESSURE: 138 MMHG | WEIGHT: 227 LBS | BODY MASS INDEX: 30.75 KG/M2

## 2022-01-19 DIAGNOSIS — G47.33 OSA (OBSTRUCTIVE SLEEP APNEA): ICD-10-CM

## 2022-01-19 PROCEDURE — 99214 OFFICE O/P EST MOD 30 MIN: CPT | Performed by: INTERNAL MEDICINE

## 2022-04-05 ENCOUNTER — HOSPITAL ENCOUNTER (OUTPATIENT)
Dept: LAB | Facility: MEDICAL CENTER | Age: 57
End: 2022-04-05
Attending: FAMILY MEDICINE
Payer: COMMERCIAL

## 2022-04-05 LAB
ALBUMIN SERPL BCP-MCNC: 4.7 G/DL (ref 3.2–4.9)
ALBUMIN/GLOB SERPL: 1.7 G/DL
ALP SERPL-CCNC: 51 U/L (ref 30–99)
ALT SERPL-CCNC: 24 U/L (ref 2–50)
ANION GAP SERPL CALC-SCNC: 13 MMOL/L (ref 7–16)
APPEARANCE UR: CLEAR
AST SERPL-CCNC: 19 U/L (ref 12–45)
BASOPHILS # BLD AUTO: 1.2 % (ref 0–1.8)
BASOPHILS # BLD: 0.07 K/UL (ref 0–0.12)
BILIRUB SERPL-MCNC: 1.3 MG/DL (ref 0.1–1.5)
BILIRUB UR QL STRIP.AUTO: NEGATIVE
BUN SERPL-MCNC: 16 MG/DL (ref 8–22)
CALCIUM SERPL-MCNC: 9.7 MG/DL (ref 8.5–10.5)
CHLORIDE SERPL-SCNC: 103 MMOL/L (ref 96–112)
CHOLEST SERPL-MCNC: 165 MG/DL (ref 100–199)
CO2 SERPL-SCNC: 25 MMOL/L (ref 20–33)
COLOR UR: YELLOW
CREAT SERPL-MCNC: 0.89 MG/DL (ref 0.5–1.4)
CRP SERPL HS-MCNC: <0.3 MG/DL (ref 0–0.75)
EOSINOPHIL # BLD AUTO: 0.14 K/UL (ref 0–0.51)
EOSINOPHIL NFR BLD: 2.4 % (ref 0–6.9)
ERYTHROCYTE [DISTWIDTH] IN BLOOD BY AUTOMATED COUNT: 41.2 FL (ref 35.9–50)
EST. AVERAGE GLUCOSE BLD GHB EST-MCNC: 126 MG/DL
FASTING STATUS PATIENT QL REPORTED: NORMAL
GFR SERPLBLD CREATININE-BSD FMLA CKD-EPI: 100 ML/MIN/1.73 M 2
GLOBULIN SER CALC-MCNC: 2.7 G/DL (ref 1.9–3.5)
GLUCOSE SERPL-MCNC: 102 MG/DL (ref 65–99)
GLUCOSE UR STRIP.AUTO-MCNC: NEGATIVE MG/DL
HBA1C MFR BLD: 6 % (ref 4–5.6)
HCT VFR BLD AUTO: 46 % (ref 42–52)
HDLC SERPL-MCNC: 44 MG/DL
HGB BLD-MCNC: 15.6 G/DL (ref 14–18)
IMM GRANULOCYTES # BLD AUTO: 0.02 K/UL (ref 0–0.11)
IMM GRANULOCYTES NFR BLD AUTO: 0.3 % (ref 0–0.9)
KETONES UR STRIP.AUTO-MCNC: NEGATIVE MG/DL
LDLC SERPL CALC-MCNC: 105 MG/DL
LEUKOCYTE ESTERASE UR QL STRIP.AUTO: NEGATIVE
LYMPHOCYTES # BLD AUTO: 2 K/UL (ref 1–4.8)
LYMPHOCYTES NFR BLD: 34.5 % (ref 22–41)
MCH RBC QN AUTO: 29.9 PG (ref 27–33)
MCHC RBC AUTO-ENTMCNC: 33.9 G/DL (ref 33.7–35.3)
MCV RBC AUTO: 88.1 FL (ref 81.4–97.8)
MICRO URNS: NORMAL
MONOCYTES # BLD AUTO: 0.42 K/UL (ref 0–0.85)
MONOCYTES NFR BLD AUTO: 7.2 % (ref 0–13.4)
NEUTROPHILS # BLD AUTO: 3.15 K/UL (ref 1.82–7.42)
NEUTROPHILS NFR BLD: 54.4 % (ref 44–72)
NITRITE UR QL STRIP.AUTO: NEGATIVE
NRBC # BLD AUTO: 0 K/UL
NRBC BLD-RTO: 0 /100 WBC
PH UR STRIP.AUTO: 5.5 [PH] (ref 5–8)
PLATELET # BLD AUTO: 238 K/UL (ref 164–446)
PMV BLD AUTO: 10.4 FL (ref 9–12.9)
POTASSIUM SERPL-SCNC: 4 MMOL/L (ref 3.6–5.5)
PROT SERPL-MCNC: 7.4 G/DL (ref 6–8.2)
PROT UR QL STRIP: NEGATIVE MG/DL
PSA SERPL-MCNC: 1.32 NG/ML (ref 0–4)
RBC # BLD AUTO: 5.22 M/UL (ref 4.7–6.1)
RBC UR QL AUTO: NEGATIVE
SODIUM SERPL-SCNC: 141 MMOL/L (ref 135–145)
SP GR UR STRIP.AUTO: 1.02
TRIGL SERPL-MCNC: 82 MG/DL (ref 0–149)
TSH SERPL DL<=0.005 MIU/L-ACNC: 1.99 UIU/ML (ref 0.38–5.33)
URATE SERPL-MCNC: 6.1 MG/DL (ref 2.5–8.3)
UROBILINOGEN UR STRIP.AUTO-MCNC: 0.2 MG/DL
WBC # BLD AUTO: 5.8 K/UL (ref 4.8–10.8)

## 2022-04-05 PROCEDURE — 80061 LIPID PANEL: CPT

## 2022-04-05 PROCEDURE — 36415 COLL VENOUS BLD VENIPUNCTURE: CPT

## 2022-04-05 PROCEDURE — 84443 ASSAY THYROID STIM HORMONE: CPT

## 2022-04-05 PROCEDURE — 84550 ASSAY OF BLOOD/URIC ACID: CPT

## 2022-04-05 PROCEDURE — 81003 URINALYSIS AUTO W/O SCOPE: CPT

## 2022-04-05 PROCEDURE — 80053 COMPREHEN METABOLIC PANEL: CPT

## 2022-04-05 PROCEDURE — 86140 C-REACTIVE PROTEIN: CPT

## 2022-04-05 PROCEDURE — 83036 HEMOGLOBIN GLYCOSYLATED A1C: CPT

## 2022-04-05 PROCEDURE — 83695 ASSAY OF LIPOPROTEIN(A): CPT

## 2022-04-05 PROCEDURE — 85652 RBC SED RATE AUTOMATED: CPT

## 2022-04-05 PROCEDURE — 84153 ASSAY OF PSA TOTAL: CPT

## 2022-04-05 PROCEDURE — 85025 COMPLETE CBC W/AUTO DIFF WBC: CPT

## 2022-04-06 LAB — ERYTHROCYTE [SEDIMENTATION RATE] IN BLOOD BY WESTERGREN METHOD: 5 MM/HOUR (ref 0–20)

## 2022-04-08 LAB — LPA SERPL-MCNC: 50 MG/DL

## 2023-03-28 ENCOUNTER — HOSPITAL ENCOUNTER (OUTPATIENT)
Dept: LAB | Facility: MEDICAL CENTER | Age: 58
End: 2023-03-28
Attending: FAMILY MEDICINE
Payer: COMMERCIAL

## 2023-03-28 LAB
ALBUMIN SERPL BCP-MCNC: 4.5 G/DL (ref 3.2–4.9)
ALBUMIN/GLOB SERPL: 2 G/DL
ALP SERPL-CCNC: 49 U/L (ref 30–99)
ALT SERPL-CCNC: 28 U/L (ref 2–50)
ANION GAP SERPL CALC-SCNC: 9 MMOL/L (ref 7–16)
AST SERPL-CCNC: 19 U/L (ref 12–45)
BILIRUB SERPL-MCNC: 1.3 MG/DL (ref 0.1–1.5)
BUN SERPL-MCNC: 20 MG/DL (ref 8–22)
CALCIUM ALBUM COR SERPL-MCNC: 8.8 MG/DL (ref 8.5–10.5)
CALCIUM SERPL-MCNC: 9.2 MG/DL (ref 8.5–10.5)
CHLORIDE SERPL-SCNC: 102 MMOL/L (ref 96–112)
CHOLEST SERPL-MCNC: 184 MG/DL (ref 100–199)
CO2 SERPL-SCNC: 26 MMOL/L (ref 20–33)
CREAT SERPL-MCNC: 0.89 MG/DL (ref 0.5–1.4)
CREAT UR-MCNC: 122.61 MG/DL
EST. AVERAGE GLUCOSE BLD GHB EST-MCNC: 134 MG/DL
FASTING STATUS PATIENT QL REPORTED: NORMAL
GFR SERPLBLD CREATININE-BSD FMLA CKD-EPI: 100 ML/MIN/1.73 M 2
GLOBULIN SER CALC-MCNC: 2.3 G/DL (ref 1.9–3.5)
GLUCOSE SERPL-MCNC: 129 MG/DL (ref 65–99)
HBA1C MFR BLD: 6.3 % (ref 4–5.6)
HDLC SERPL-MCNC: 44 MG/DL
LDLC SERPL CALC-MCNC: 116 MG/DL
MICROALBUMIN UR-MCNC: <1.2 MG/DL
MICROALBUMIN/CREAT UR: NORMAL MG/G (ref 0–30)
POTASSIUM SERPL-SCNC: 4.4 MMOL/L (ref 3.6–5.5)
PROT SERPL-MCNC: 6.8 G/DL (ref 6–8.2)
PSA SERPL-MCNC: 1.29 NG/ML (ref 0–4)
SODIUM SERPL-SCNC: 137 MMOL/L (ref 135–145)
TRIGL SERPL-MCNC: 122 MG/DL (ref 0–149)
TSH SERPL DL<=0.005 MIU/L-ACNC: 2.02 UIU/ML (ref 0.38–5.33)

## 2023-03-28 PROCEDURE — 80061 LIPID PANEL: CPT

## 2023-03-28 PROCEDURE — 80053 COMPREHEN METABOLIC PANEL: CPT

## 2023-03-28 PROCEDURE — 84153 ASSAY OF PSA TOTAL: CPT

## 2023-03-28 PROCEDURE — 82043 UR ALBUMIN QUANTITATIVE: CPT

## 2023-03-28 PROCEDURE — 84443 ASSAY THYROID STIM HORMONE: CPT

## 2023-03-28 PROCEDURE — 83036 HEMOGLOBIN GLYCOSYLATED A1C: CPT

## 2023-03-28 PROCEDURE — 82570 ASSAY OF URINE CREATININE: CPT

## 2023-03-28 PROCEDURE — 36415 COLL VENOUS BLD VENIPUNCTURE: CPT

## 2023-08-25 ENCOUNTER — HOSPITAL ENCOUNTER (OUTPATIENT)
Dept: RADIOLOGY | Facility: MEDICAL CENTER | Age: 58
End: 2023-08-25
Attending: FAMILY MEDICINE
Payer: COMMERCIAL

## 2023-08-25 DIAGNOSIS — R10.9 ABDOMINAL PAIN, UNSPECIFIED ABDOMINAL LOCATION: ICD-10-CM

## 2023-08-25 PROBLEM — I72.2 ANEURYSM OF LEFT RENAL ARTERY (HCC): Status: ACTIVE | Noted: 2023-08-25

## 2023-08-25 PROCEDURE — 74177 CT ABD & PELVIS W/CONTRAST: CPT

## 2023-08-25 PROCEDURE — 700117 HCHG RX CONTRAST REV CODE 255: Performed by: FAMILY MEDICINE

## 2023-08-25 RX ADMIN — IOHEXOL 100 ML: 350 INJECTION, SOLUTION INTRAVENOUS at 14:00

## 2023-12-15 ENCOUNTER — TELEPHONE (OUTPATIENT)
Dept: SLEEP MEDICINE | Facility: MEDICAL CENTER | Age: 58
End: 2023-12-15
Payer: COMMERCIAL

## 2023-12-15 NOTE — TELEPHONE ENCOUNTER
Pt called and was told by provider that we can schedule him an appt. We are still awaiting      Dr. Monk January schedule, and informed pt that he was placed on the recall per Madisyn, Specialty scheduling. Pt will also reach out to provider himself as they are friends, but requested that we call him on his cell when schedule opens up and becomes available.

## 2023-12-20 ENCOUNTER — HOSPITAL ENCOUNTER (OUTPATIENT)
Dept: LAB | Facility: MEDICAL CENTER | Age: 58
End: 2023-12-20
Attending: DERMATOLOGY
Payer: COMMERCIAL

## 2023-12-20 ENCOUNTER — HOSPITAL ENCOUNTER (OUTPATIENT)
Dept: LAB | Facility: MEDICAL CENTER | Age: 58
End: 2023-12-20
Attending: FAMILY MEDICINE
Payer: COMMERCIAL

## 2023-12-20 LAB
ALBUMIN SERPL BCP-MCNC: 4.5 G/DL (ref 3.2–4.9)
ALBUMIN/GLOB SERPL: 1.7 G/DL
ALP SERPL-CCNC: 51 U/L (ref 30–99)
ALT SERPL-CCNC: 30 U/L (ref 2–50)
ANION GAP SERPL CALC-SCNC: 9 MMOL/L (ref 7–16)
AST SERPL-CCNC: 20 U/L (ref 12–45)
BASOPHILS # BLD AUTO: 1 % (ref 0–1.8)
BASOPHILS # BLD: 0.05 K/UL (ref 0–0.12)
BILIRUB SERPL-MCNC: 1 MG/DL (ref 0.1–1.5)
BUN SERPL-MCNC: 22 MG/DL (ref 8–22)
CALCIUM ALBUM COR SERPL-MCNC: 8.9 MG/DL (ref 8.5–10.5)
CALCIUM SERPL-MCNC: 9.3 MG/DL (ref 8.5–10.5)
CHLORIDE SERPL-SCNC: 103 MMOL/L (ref 96–112)
CHOLEST SERPL-MCNC: 106 MG/DL (ref 100–199)
CO2 SERPL-SCNC: 26 MMOL/L (ref 20–33)
CREAT SERPL-MCNC: 0.83 MG/DL (ref 0.5–1.4)
CRP SERPL HS-MCNC: 3.1 MG/L (ref 0–3)
EOSINOPHIL # BLD AUTO: 0.27 K/UL (ref 0–0.51)
EOSINOPHIL NFR BLD: 5.1 % (ref 0–6.9)
ERYTHROCYTE [DISTWIDTH] IN BLOOD BY AUTOMATED COUNT: 41.6 FL (ref 35.9–50)
EST. AVERAGE GLUCOSE BLD GHB EST-MCNC: 123 MG/DL
FASTING STATUS PATIENT QL REPORTED: NORMAL
GFR SERPLBLD CREATININE-BSD FMLA CKD-EPI: 101 ML/MIN/1.73 M 2
GLOBULIN SER CALC-MCNC: 2.6 G/DL (ref 1.9–3.5)
GLUCOSE SERPL-MCNC: 111 MG/DL (ref 65–99)
HBA1C MFR BLD: 5.9 % (ref 4–5.6)
HCT VFR BLD AUTO: 47.1 % (ref 42–52)
HDLC SERPL-MCNC: 42 MG/DL
HGB BLD-MCNC: 16.3 G/DL (ref 14–18)
IMM GRANULOCYTES # BLD AUTO: 0.01 K/UL (ref 0–0.11)
IMM GRANULOCYTES NFR BLD AUTO: 0.2 % (ref 0–0.9)
LDLC SERPL CALC-MCNC: 49 MG/DL
LYMPHOCYTES # BLD AUTO: 1.6 K/UL (ref 1–4.8)
LYMPHOCYTES NFR BLD: 30.4 % (ref 22–41)
MCH RBC QN AUTO: 30.3 PG (ref 27–33)
MCHC RBC AUTO-ENTMCNC: 34.6 G/DL (ref 32.3–36.5)
MCV RBC AUTO: 87.5 FL (ref 81.4–97.8)
MONOCYTES # BLD AUTO: 0.43 K/UL (ref 0–0.85)
MONOCYTES NFR BLD AUTO: 8.2 % (ref 0–13.4)
NEUTROPHILS # BLD AUTO: 2.9 K/UL (ref 1.82–7.42)
NEUTROPHILS NFR BLD: 55.1 % (ref 44–72)
NRBC # BLD AUTO: 0 K/UL
NRBC BLD-RTO: 0 /100 WBC (ref 0–0.2)
PLATELET # BLD AUTO: 196 K/UL (ref 164–446)
PMV BLD AUTO: 10.3 FL (ref 9–12.9)
POTASSIUM SERPL-SCNC: 4.1 MMOL/L (ref 3.6–5.5)
PROT SERPL-MCNC: 7.1 G/DL (ref 6–8.2)
PSA SERPL-MCNC: 1.19 NG/ML (ref 0–4)
RBC # BLD AUTO: 5.38 M/UL (ref 4.7–6.1)
SODIUM SERPL-SCNC: 138 MMOL/L (ref 135–145)
TRIGL SERPL-MCNC: 73 MG/DL (ref 0–149)
WBC # BLD AUTO: 5.3 K/UL (ref 4.8–10.8)

## 2023-12-20 PROCEDURE — 80061 LIPID PANEL: CPT

## 2023-12-20 PROCEDURE — 85025 COMPLETE CBC W/AUTO DIFF WBC: CPT

## 2023-12-20 PROCEDURE — 82172 ASSAY OF APOLIPOPROTEIN: CPT

## 2023-12-20 PROCEDURE — 83695 ASSAY OF LIPOPROTEIN(A): CPT

## 2023-12-20 PROCEDURE — 86141 C-REACTIVE PROTEIN HS: CPT

## 2023-12-20 PROCEDURE — 80053 COMPREHEN METABOLIC PANEL: CPT

## 2023-12-20 PROCEDURE — 83704 LIPOPROTEIN BLD QUAN PART: CPT

## 2023-12-20 PROCEDURE — 84153 ASSAY OF PSA TOTAL: CPT

## 2023-12-20 PROCEDURE — 83036 HEMOGLOBIN GLYCOSYLATED A1C: CPT

## 2023-12-20 PROCEDURE — 36415 COLL VENOUS BLD VENIPUNCTURE: CPT

## 2023-12-21 LAB
APO B100 SERPL-MCNC: 58 MG/DL (ref 66–133)
LPA SERPL-MCNC: 53 MG/DL

## 2023-12-23 LAB
CHOLEST SERPL-MCNC: 109 MG/DL
HDL PARTICAL NO Q4363: 31.7 UMOL/L
HDL SIZE Q4361: 8.5 NM
HDLC SERPL-MCNC: 42 MG/DL (ref 40–59)
HLD.LARGE SERPL-SCNC: <2.8 UMOL/L
L VLDL PART NO Q4357: 3.2 NMOL/L
LDL SERPL QN: 20.7 NM
LDL SERPL-SCNC: 750 NMOL/L
LDL SMALL SERPL-SCNC: 485 NMOL/L
LDLC SERPL CALC-MCNC: 51 MG/DL
PATHOLOGY STUDY: ABNORMAL
TRIGL SERPL-MCNC: 79 MG/DL (ref 30–149)
VLDL SIZE Q4362: 51 NM

## 2024-01-22 NOTE — PROGRESS NOTES
CC: Annual visit for LAVERN and treatment emergent central apnea on ASV 6/15/3 CWP.        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 and last seen 1/19/2022 when his 30-day compliance was 77% for 6 hours and 36 minutes with an average residual AHI 0.6 on ASV 6/15/3 CWP.    Today his 30-day compliance is 97% for greater than or equal to 4 hours.  His average usage is 7 hours and 7 minutes.  On ASV 6/15/3 CWP his average residual apnea-hypopnea index is a normal 0.7.  He is not having any significant leak.    His machine is old.  We will order a new ASV machine for him today.      The patient reports improved symptoms using positive airway pressure.  Has experienced no significant problems with claustrophobia, nosebleeds, sore throat, dry eyes, mask fit, air leaks around the mask, pressure tolerance, excessive airway dryness, dry or runny nose, nasal congestion, facial irritation, aerophagia, or chest pain.     Past medical history significant for possible immunization care gaps, non-smoker, prediabetes, left renal artery aneurysm, and diverticulosis.        Patient Active Problem List    Diagnosis Date Noted    Aneurysm of left renal artery (HCC) 08/25/2023    LAVERN (obstructive sleep apnea) 10/22/2020    Non-smoker 10/22/2020    Need for influenza vaccination 10/22/2020       Past Medical History:   Diagnosis Date    Sleep apnea     Snoring     sleep apnea questionairre completed and on chart        Past Surgical History:   Procedure Laterality Date    INGUINAL HERNIA REPAIR  2/25/2011    Performed by NICKY WANG at SURGERY [facility] ORS    INGUINAL HERNIA LAPAROSCOPIC  10/10/08    left; Performed by NICKY WANG at SURGERY SAME DAY [facility] ORS    KNEE ARTHROSCOPY  2007    right       Family History   Problem Relation Age of Onset    Diabetes Other     Heart Disease Other     Hypertension Other     Cancer Other     Sleep Apnea Brother     Sleep Apnea  "Maternal Uncle        Social History     Socioeconomic History    Marital status:      Spouse name: Not on file    Number of children: Not on file    Years of education: Not on file    Highest education level: Not on file   Occupational History    Not on file   Tobacco Use    Smoking status: Never    Smokeless tobacco: Never   Vaping Use    Vaping Use: Never used   Substance and Sexual Activity    Alcohol use: Yes     Comment: weekends    Drug use: No    Sexual activity: Not on file   Other Topics Concern    Not on file   Social History Narrative    Not on file     Social Determinants of Health     Financial Resource Strain: Not on file   Food Insecurity: Not on file   Transportation Needs: Not on file   Physical Activity: Not on file   Stress: Not on file   Social Connections: Not on file   Intimate Partner Violence: Not on file   Housing Stability: Not on file       Current Outpatient Medications   Medication Sig Dispense Refill    FARXIGA 10 MG Tab       rosuvastatin (CRESTOR) 20 MG Tab       telmisartan (MICARDIS) 80 MG Tab       fluticasone (FLONASE) 50 MCG/ACT nasal spray Spray 1 Spray in nose every day.      aspirin 81 MG tablet Take 81 mg by mouth every day.       No current facility-administered medications for this visit.    \"CURRENT RX\"    ALLERGIES: Sulfa drugs    ROS      Constitutional: Denies fever, chills, sweats,  weight loss, fatigue  Cardiovascular: Denies chest pain, tightness, palpitations, swelling in legs/feet, fainting, difficulty breathing when lying down but gets better when sitting up.   Respiratory: Denies shortness of breath, cough, sputum, wheezing, painful breathing, coughing up blood.   Sleep: per HPI  Gastrointestinal: Denies  difficulty swallowing, nausea, abdominal pain, diarrhea, constipation, heartburn.  Musculoskeletal: Denies painful joints, sore muscles, back pain.        PHYSICAL EXAM        /84 (BP Location: Right arm, Patient Position: Sitting, BP Cuff Size: " Adult)   Pulse 72   Resp 14   Ht 1.829 m (6')   Wt 104 kg (229 lb)   SpO2 95%   BMI 31.06 kg/m²   Appearance: Well-nourished, well-developed, no acute distress  Eyes:  PERRLA, EOMI  ENMT: without change  Neck: Supple, trachea midline, no masses  Respiratory effort:  No intercostal retractions or use of accessory muscles  Lung auscultation:  No wheezes rhonchi rubs or rales  Cardiac: No murmurs, rubs, or gallops; regular rhythm, normal rate; no edema  Abdomen:  No tenderness, no organomegaly.  Musculoskeletal:  Grossly normal; gait and station normal; digits and nails normal  Skin:  No rashes, petechiae, cyanosis  Neurologic: without focal signs; oriented to person, time, place, and purpose; judgement intact  Psychiatric:  No depression, anxiety, agitation      Medical Decision Making       The medical record was reviewed in its entirety including the referral notes, records from primary care, consultants notes, hospital records, lab, imaging, microbiology, immunology, and immunizations.  Care gaps identified and reviewed with the patient.    Diagnostic and titration nocturnal polysomnogram's, home sleep apnea tests, continuous nocturnal oximetry results, multiple sleep latency tests, and compliance reports reviewed.  1. LAVERN (obstructive sleep apnea)  - DME Mask and Supplies  - DME ASV    2. Treatment-emergent central sleep apnea  - DME ASV    Other orders  - FARXIGA 10 MG Tab  - rosuvastatin (CRESTOR) 20 MG Tab  - telmisartan (MICARDIS) 80 MG Tab      PLAN:   Have ordered him a new ASV machine with an EPAP of 6 and pressure support of 3/15 CWP using a mask of choice, heated humidification, and heated tubing followed by clinical and compliance reviews 60 to 90 days after he gets his equipment.    Has been advised to continue the current ASV 6/15/3 CWP, clean equipment frequently, and get new mask and supplies as allowed by insurance and DME. Advised about potential problems including nasal  obstruction/stuffiness, mask leak or discomfort , frequent awakenings, chill or dryness of the upper airway, noise, inconvenience, and lack of benefit. Recommend an earlier appointment, if significant treatment barriers develop.    The risks of untreated sleep apnea were discussed with the patient at length. Patients with LAVERN are at increased risk of cardiovascular disease including systemic arterial hypertension, pulmonary arterial hypertension (transient or fixed), TIA, and an elevated risk of stroke, heart attack, sudden death, or arrhythmia between the hours of 11 PM and 6 AM. LAVERN patients have an increased risk of motor vehicle accidents, type 2 diabetes, GERD, repetitive mechanical trauma to pharyngeal muscles with inflammation and denervation, frequent EEG arousals leading to nonrestorative sleep, excessive daytime sleepiness, fatigue, depression, poor pain control, irritability, and lower quality of life.  The patient was advised to avoid driving a motor vehicle when drowsy.    Positive airway pressure will favorably impact many of the adverse conditions and effects provoked by LAVREN.    Have advised the patient to follow up with the appropriate healthcare practitioners for all other medical problems and issues.    Return in about 10 weeks (around 4/3/2024).    Total time 30 minutes

## 2024-01-24 ENCOUNTER — OFFICE VISIT (OUTPATIENT)
Dept: SLEEP MEDICINE | Facility: MEDICAL CENTER | Age: 59
End: 2024-01-24
Attending: INTERNAL MEDICINE
Payer: COMMERCIAL

## 2024-01-24 VITALS
OXYGEN SATURATION: 95 % | HEIGHT: 72 IN | DIASTOLIC BLOOD PRESSURE: 84 MMHG | SYSTOLIC BLOOD PRESSURE: 124 MMHG | WEIGHT: 229 LBS | HEART RATE: 72 BPM | BODY MASS INDEX: 31.02 KG/M2 | RESPIRATION RATE: 14 BRPM

## 2024-01-24 DIAGNOSIS — G47.39 TREATMENT-EMERGENT CENTRAL SLEEP APNEA: ICD-10-CM

## 2024-01-24 DIAGNOSIS — G47.33 OSA (OBSTRUCTIVE SLEEP APNEA): ICD-10-CM

## 2024-01-24 PROCEDURE — 3074F SYST BP LT 130 MM HG: CPT | Performed by: INTERNAL MEDICINE

## 2024-01-24 PROCEDURE — 99213 OFFICE O/P EST LOW 20 MIN: CPT | Performed by: INTERNAL MEDICINE

## 2024-01-24 PROCEDURE — 3079F DIAST BP 80-89 MM HG: CPT | Performed by: INTERNAL MEDICINE

## 2024-01-24 PROCEDURE — 99214 OFFICE O/P EST MOD 30 MIN: CPT | Performed by: INTERNAL MEDICINE

## 2024-01-24 RX ORDER — TELMISARTAN 80 MG/1
TABLET ORAL
COMMUNITY
Start: 2023-02-20

## 2024-01-24 RX ORDER — DAPAGLIFLOZIN 10 MG/1
TABLET, FILM COATED ORAL
COMMUNITY
Start: 2023-02-20

## 2024-01-24 RX ORDER — ROSUVASTATIN CALCIUM 20 MG/1
TABLET, COATED ORAL
COMMUNITY
Start: 2023-02-01

## 2024-01-24 ASSESSMENT — FIBROSIS 4 INDEX: FIB4 SCORE: 1.08

## 2024-01-24 ASSESSMENT — PATIENT HEALTH QUESTIONNAIRE - PHQ9: CLINICAL INTERPRETATION OF PHQ2 SCORE: 0

## 2024-10-28 ENCOUNTER — RX ONLY (OUTPATIENT)
Age: 59
Setting detail: RX ONLY
End: 2024-10-28

## 2024-10-28 RX ORDER — SEMAGLUTIDE 0.25 MG/.5ML
INJECTION, SOLUTION SUBCUTANEOUS
Qty: 2 | Refills: 4 | Status: ERX | COMMUNITY
Start: 2024-10-28

## 2024-12-12 ENCOUNTER — RX ONLY (RX ONLY)
Age: 59
End: 2024-12-12

## 2024-12-12 RX ORDER — CYCLOBENZAPRINE HYDROCHLORIDE 10 MG/1
1 TABLET TABLET, FILM COATED ORAL EVERY 8 HOURS
Qty: 60 | Refills: 2 | Status: ERX | COMMUNITY
Start: 2024-12-12

## 2024-12-13 ENCOUNTER — OFFICE VISIT (OUTPATIENT)
Dept: MEDICAL GROUP | Facility: CLINIC | Age: 59
End: 2024-12-13
Payer: COMMERCIAL

## 2024-12-13 ENCOUNTER — APPOINTMENT (OUTPATIENT)
Dept: RADIOLOGY | Facility: CLINIC | Age: 59
End: 2024-12-13
Attending: FAMILY MEDICINE
Payer: COMMERCIAL

## 2024-12-13 VITALS
HEART RATE: 100 BPM | HEIGHT: 72 IN | OXYGEN SATURATION: 98 % | WEIGHT: 232 LBS | RESPIRATION RATE: 16 BRPM | SYSTOLIC BLOOD PRESSURE: 128 MMHG | DIASTOLIC BLOOD PRESSURE: 89 MMHG | BODY MASS INDEX: 31.42 KG/M2

## 2024-12-13 DIAGNOSIS — M54.50 ACUTE BILATERAL LOW BACK PAIN WITHOUT SCIATICA: ICD-10-CM

## 2024-12-13 PROCEDURE — 72110 X-RAY EXAM L-2 SPINE 4/>VWS: CPT | Mod: TC | Performed by: RADIOLOGY

## 2024-12-13 PROCEDURE — 3079F DIAST BP 80-89 MM HG: CPT | Performed by: FAMILY MEDICINE

## 2024-12-13 PROCEDURE — 3074F SYST BP LT 130 MM HG: CPT | Performed by: FAMILY MEDICINE

## 2024-12-13 PROCEDURE — 99213 OFFICE O/P EST LOW 20 MIN: CPT | Performed by: FAMILY MEDICINE

## 2024-12-13 RX ORDER — CYCLOBENZAPRINE HCL 10 MG
10 TABLET ORAL 3 TIMES DAILY PRN
Qty: 10 TABLET | Refills: 0 | Status: SHIPPED | OUTPATIENT
Start: 2024-12-13 | End: 2024-12-23

## 2024-12-13 ASSESSMENT — FIBROSIS 4 INDEX: FIB4 SCORE: 1.1

## 2024-12-18 PROBLEM — M54.50 ACUTE BILATERAL LOW BACK PAIN WITHOUT SCIATICA: Status: ACTIVE | Noted: 2024-12-18

## 2024-12-18 ASSESSMENT — ENCOUNTER SYMPTOMS
NEUROLOGICAL NEGATIVE: 1
PSYCHIATRIC NEGATIVE: 1
EYES NEGATIVE: 1
GASTROINTESTINAL NEGATIVE: 1
RESPIRATORY NEGATIVE: 1
CARDIOVASCULAR NEGATIVE: 1
CONSTITUTIONAL NEGATIVE: 1

## 2024-12-18 NOTE — ASSESSMENT & PLAN NOTE
I believe this is all paraspinous spasm.  X-rays done today and reviewed by me show no fractures or dislocations pars interarticularis is look fine.  I am going to go ahead and recommend that he continue the NSAID that he is taking and some gentle range of motion I will also go ahead and recommend that he start some physical therapy.  Will follow him up in 3 to 4 weeks if he is not doing any better.
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
## 2025-06-30 ENCOUNTER — RX ONLY (RX ONLY)
Age: 60
End: 2025-06-30

## 2025-06-30 RX ORDER — ROSUVASTATIN CALCIUM 20 MG/1
1 TABLET, COATED ORAL QD
Qty: 90 | Refills: 3 | Status: ERX | COMMUNITY
Start: 2025-06-30